# Patient Record
Sex: FEMALE | Race: WHITE | Employment: OTHER | URBAN - METROPOLITAN AREA
[De-identification: names, ages, dates, MRNs, and addresses within clinical notes are randomized per-mention and may not be internally consistent; named-entity substitution may affect disease eponyms.]

---

## 2018-10-18 ENCOUNTER — OFFICE VISIT (OUTPATIENT)
Dept: BEHAVIORAL/MENTAL HEALTH CLINIC | Age: 83
End: 2018-10-18

## 2018-10-18 VITALS
DIASTOLIC BLOOD PRESSURE: 63 MMHG | HEART RATE: 85 BPM | SYSTOLIC BLOOD PRESSURE: 160 MMHG | BODY MASS INDEX: 22.82 KG/M2 | WEIGHT: 124 LBS | HEIGHT: 62 IN

## 2018-10-18 DIAGNOSIS — F33.1 MODERATE EPISODE OF RECURRENT MAJOR DEPRESSIVE DISORDER (HCC): Primary | ICD-10-CM

## 2018-10-18 DIAGNOSIS — F10.21 ALCOHOL USE DISORDER, SEVERE, IN EARLY REMISSION (HCC): ICD-10-CM

## 2018-10-18 DIAGNOSIS — F41.8 OTHER SPECIFIED ANXIETY DISORDERS: ICD-10-CM

## 2018-10-18 DIAGNOSIS — G31.84 MCI (MILD COGNITIVE IMPAIRMENT) WITH MEMORY LOSS: ICD-10-CM

## 2018-10-18 RX ORDER — EZETIMIBE 10 MG/1
10 TABLET ORAL DAILY
COMMUNITY

## 2018-10-18 RX ORDER — MULTIVITAMIN
1 CAPSULE ORAL
COMMUNITY

## 2018-10-18 RX ORDER — BUSPIRONE HYDROCHLORIDE 15 MG/1
7.5 TABLET ORAL 2 TIMES DAILY
COMMUNITY
End: 2019-09-18 | Stop reason: SDUPTHER

## 2018-10-18 RX ORDER — GUAIFENESIN 100 MG/5ML
81 LIQUID (ML) ORAL DAILY
COMMUNITY

## 2018-10-18 RX ORDER — PANTOPRAZOLE SODIUM 40 MG/1
40 TABLET, DELAYED RELEASE ORAL
COMMUNITY

## 2018-10-18 RX ORDER — CETIRIZINE HCL 10 MG
10 TABLET ORAL
COMMUNITY

## 2018-10-18 RX ORDER — ATORVASTATIN CALCIUM 20 MG/1
20 TABLET, FILM COATED ORAL
COMMUNITY

## 2018-10-18 RX ORDER — IBUPROFEN 200 MG
950 CAPSULE ORAL
COMMUNITY
End: 2018-12-11 | Stop reason: ALTCHOICE

## 2018-10-18 RX ORDER — MIRTAZAPINE 15 MG/1
7.5 TABLET, FILM COATED ORAL
COMMUNITY
End: 2019-09-18 | Stop reason: SDUPTHER

## 2018-10-18 RX ORDER — LANOLIN ALCOHOL/MO/W.PET/CERES
500 CREAM (GRAM) TOPICAL DAILY
COMMUNITY

## 2018-10-18 RX ORDER — FERROUS SULFATE, DRIED 160(50) MG
1 TABLET, EXTENDED RELEASE ORAL
COMMUNITY

## 2018-10-18 RX ORDER — BUDESONIDE AND FORMOTEROL FUMARATE DIHYDRATE 80; 4.5 UG/1; UG/1
2 AEROSOL RESPIRATORY (INHALATION)
COMMUNITY

## 2018-10-18 RX ORDER — FLUTICASONE PROPIONATE 50 MCG
2 SPRAY, SUSPENSION (ML) NASAL DAILY
COMMUNITY

## 2018-10-18 RX ORDER — ALBUTEROL SULFATE 90 UG/1
AEROSOL, METERED RESPIRATORY (INHALATION)
COMMUNITY

## 2018-10-18 RX ORDER — CARVEDILOL 3.12 MG/1
3.12 TABLET ORAL 2 TIMES DAILY
COMMUNITY

## 2018-10-18 NOTE — PROGRESS NOTES
Psychiatric Outpatient Progress Note    Account Number:  [de-identified]  Name: Charline Lange    SUBJECTIVE:   CHIEF COMPLAINT:  Charline Lange is a 80 y.o. female and was seen today for first follow-up of psychiatric condition and psychotropic medication management. Pt was brought by her niece/POA from [place] [id number]. Pt was initially evaluated by me in [facility] on 8/18/18. Niece was not informed about this evaluation and she made this appointment independently for medications management. HPI:    Gerard Barthel reports the following psychiatric symptoms:  depression, anxiety and MCI, alcohol use d/o - in partial remission. The symptoms have been present for years and are of moderate severity. The symptoms occur daily. When patient was evaluated on 8/18/2018 in the assisted living facility, patient was adjusting to the environment fairly well. At that time also patient complained of not being happy with the relocation from [place] to [place], that she had all her friends and Jew activities. She reported that she misses all those activities and her friends. Today she continued to complain the same way and was very irritable and argumentative about her staying at his assisted living facility. Patient is marginally aware about the reasons why her POA decided to relocate her to [place]. Patient was in psychiatric facility in May of this year in [place], where she was treated for alcohol withdrawal syndrome, congestive heart failure and acute renal failure which was precipitated by her heavy drinking and not taking care of herself. After the discharge from hospital, patient was not doing so well and her niece decided to move her to [place] in an assisted living facility. Her niece is not very happy with the care provided in her current  facility and is planning to move her to another assisted living facility, which the patient does not like.   Patient reports sense of helplessness and hopelessness about her situation. She is not grossly manic or psychotic. She has mild cognitive impairment. She is sleeping and eating well. She does not associate with the residents in the facility and has not made any friends. She does participate in exercise routine and some meals in the dining area. Many times she prefers to eat by herself in her room. She has a cat and spends a lot of time with her. Patient is very nostalgic and wishes to go back to [place] where she can get back with her friends and [employer] goers. Patient has multiple medical problems and is on polypharmacy. At this time she is medically stable. She was 124 pounds and her BMI is 23. Today her blood pressure and heart rate is within normal limits. Patient is compliant with her medications. Contributing factors include: relocation    Patient denies SI/HI/SIB. Side Effects:  none      Fam/Soc Hx (from [place] with updates):  since 2010. No children. Her niece is her POA. Had [employer] diploma. Worked for [employer] in [employer] for years as . Relocated to [place] this July and lives in [address] . REVIEW OF SYSTEMS:  Constitutional: positive for fatigue  Eyes: positive for contacts/glasses  Ears, nose, mouth, throat, and face: negative for hearing loss and tinnitus  Respiratory: negative for cough or wheezing  Cardiovascular: negative for chest pain, palpitations  Gastrointestinal: negative for reflux symptoms and constipation  Genitourinary:negative for frequency and urinary incontinence  Musculoskeletal:negative for arthralgias  Neurological: positive for memory problems  Behavioral/Psych: positive for anxiety, bad mood and depression, negative for SI or HI    Visit Vitals  /63   Pulse 85   Ht 5' 2\" (1.575 m)   Wt 56.2 kg (124 lb)   BMI 22.68 kg/m²     SCALES: (10/18/18): MOCA: 21/30 - MCI  GDS:5/15 0 mild depression  HAM-A: 7 - WNL     MMSE: 27/30 - 7/14/18 at [facility]  GDS: 2/15 - 8/18/18 at [facility]    CT head : 5/29/18:    Old ischemic changes    OBJECTIVE:                 Mental Status exam: WNL except for      Sensorium  oriented to time, place and person   Relations cooperative and passive    Eye Contact    appropriate   Appearance:  age appropriate and casually dressed   Motor Behavior/Gait:  within normal limits   Speech:  normal pitch and normal volume   Thought Process: goal directed and logical   Thought Content free of delusions and free of hallucinations   Suicidal ideations none   Homicidal ideations none   Mood:  irritable   Affect:  anxious and labile   Memory recent  impaired   Memory remote:  adequate   Concentration:  impaired   Abstraction:  concrete   Insight:  limited   Reliability poor   Judgment:  limited       MEDICAL DECISION MAKING  Data: pertinent labs, imaging, medical records and diagnostic tests reviewed and incorporated in diagnosis and treatment plan    Allergies   Allergen Reactions    Influenza Virus Vaccines Other (comments)        Current Outpatient Medications   Medication Sig Dispense Refill    aspirin 81 mg chewable tablet Take 81 mg by mouth daily.  atorvastatin (LIPITOR) 20 mg tablet Take 20 mg by mouth nightly.  busPIRone (BUSPAR) 15 mg tablet Take 7.5 mg by mouth two (2) times a day.  calcium citrate 200 mg (950 mg) tablet Take 950 mg by mouth.  carvedilol (COREG) 3.125 mg tablet Take 3.125 mg by mouth two (2) times a day.  cetirizine (ZYRTEC) 10 mg tablet Take 10 mg by mouth.  ezetimibe (ZETIA) 10 mg tablet Take 10 mg by mouth daily.  fluticasone (FLONASE) 50 mcg/actuation nasal spray 2 Sprays by Both Nostrils route daily.  mirtazapine (REMERON) 15 mg tablet Take 7.5 mg by mouth nightly.  pantoprazole (PROTONIX) 40 mg tablet Take 40 mg by mouth.  denosumab (PROLIA) 60 mg/mL injection 60 mg by SubCUTAneous route.  budesonide-formoterol (SYMBICORT) 80-4.5 mcg/actuation HFAA Take 2 Puffs by inhalation.       multivitamin capsule Take 1 Cap by mouth.      calcium-vitamin D (OYSTER SHELL) 500 mg(1,250mg) -200 unit per tablet Take 1 Tab by mouth.  cyanocobalamin (VITAMIN B12) 500 mcg tablet Take 500 mcg by mouth daily.  albuterol (VENTOLIN HFA) 90 mcg/actuation inhaler Take  by inhalation.  tramadol HCl/acetaminophen (TRAMADOL-ACETAMINOPHEN PO) Take  by mouth. Problems addressed today:    ICD-10-CM ICD-9-CM    1. Moderate episode of recurrent major depressive disorder (HCC) F33.1 296.32    2. Other specified anxiety disorders F41.8 300.09    3. MCI (mild cognitive impairment) with memory loss G31.84 331.83      780.93    4. Alcohol use disorder, severe, in early remission [facility]) F10.21 305.03        Assessment:   Citlalli Roach  is a 80 y.o.  female  is responding to treatment. Symptoms are stable. Patient denies SI/HI/SIB. No evidence of AH/VH or delusions. Risk Scoring- chronic illnesses and prescription drug management    Treatment Plan:  1. Medications:          Medication Changes/Adjustments: Continue combination of BuSpar and mirtazapine in the current dosages. Patient and niece were told about my clinical impression of mild cognitive impairment and will need to start any medication for dementia at this time. Current Outpatient Medications   Medication Sig Dispense Refill    aspirin 81 mg chewable tablet Take 81 mg by mouth daily.  atorvastatin (LIPITOR) 20 mg tablet Take 20 mg by mouth nightly.  busPIRone (BUSPAR) 15 mg tablet Take 7.5 mg by mouth two (2) times a day.  calcium citrate 200 mg (950 mg) tablet Take 950 mg by mouth.  carvedilol (COREG) 3.125 mg tablet Take 3.125 mg by mouth two (2) times a day.  cetirizine (ZYRTEC) 10 mg tablet Take 10 mg by mouth.  ezetimibe (ZETIA) 10 mg tablet Take 10 mg by mouth daily.  fluticasone (FLONASE) 50 mcg/actuation nasal spray 2 Sprays by Both Nostrils route daily.       mirtazapine (REMERON) 15 mg tablet Take 7.5 mg by mouth nightly.  pantoprazole (PROTONIX) 40 mg tablet Take 40 mg by mouth.  denosumab (PROLIA) 60 mg/mL injection 60 mg by SubCUTAneous route.  budesonide-formoterol (SYMBICORT) 80-4.5 mcg/actuation HFAA Take 2 Puffs by inhalation.  multivitamin capsule Take 1 Cap by mouth.  calcium-vitamin D (OYSTER SHELL) 500 mg(1,250mg) -200 unit per tablet Take 1 Tab by mouth.  cyanocobalamin (VITAMIN B12) 500 mcg tablet Take 500 mcg by mouth daily.  albuterol (VENTOLIN HFA) 90 mcg/actuation inhaler Take  by inhalation.  tramadol HCl/acetaminophen (TRAMADOL-ACETAMINOPHEN PO) Take  by mouth. The following regarding medications was addressed:    (The risks and benefits of the proposed medication; the potential medication side effects ie    dry mouth, weight gain, GI upset, headache; patient given opportunity to ask questions)       2. Counseling and coordination of care including instructions for treatment, risks/benefits, risk factor reduction and patient/family education. She/niece agrees with the plan. Patient/niece instructed to call with any side effects, questions or issues. 3.  Patient was provided supportive counseling for her significant stress of relocation and multiple medical problems with polypharmacy. Niece was provided supportive counseling for her caregiver stress. We discussed pros and cons of her transfer to another assisted living facility, which may cause patient to become more unhappy. 4. Notes from her previous admissions, PCP and [facility] reviewed. PSYCHOTHERAPY:  approx 20 minutes  Type:  Supportive/Solution Focused psychotherapy provided  Focus:     Current problems:   Housing issues   Medical issues   Interpersonal conflicts    Psychoeducation provided:  Psych medications    Treatment plan reviewed with patient/niece-including diagnosis and medications    Roseanna Morales is slowly progressing.     Follow up : 2 months      Sukh Alvarez MD  10/18/2018

## 2018-12-11 ENCOUNTER — OFFICE VISIT (OUTPATIENT)
Dept: BEHAVIORAL/MENTAL HEALTH CLINIC | Age: 83
End: 2018-12-11

## 2018-12-11 VITALS
WEIGHT: 127 LBS | BODY MASS INDEX: 23.37 KG/M2 | SYSTOLIC BLOOD PRESSURE: 144 MMHG | HEART RATE: 88 BPM | DIASTOLIC BLOOD PRESSURE: 68 MMHG | HEIGHT: 62 IN

## 2018-12-11 DIAGNOSIS — F10.21 ALCOHOL USE DISORDER, SEVERE, IN EARLY REMISSION (HCC): ICD-10-CM

## 2018-12-11 DIAGNOSIS — G31.84 MCI (MILD COGNITIVE IMPAIRMENT) WITH MEMORY LOSS: ICD-10-CM

## 2018-12-11 DIAGNOSIS — F41.8 OTHER SPECIFIED ANXIETY DISORDERS: ICD-10-CM

## 2018-12-11 DIAGNOSIS — F33.1 MODERATE EPISODE OF RECURRENT MAJOR DEPRESSIVE DISORDER (HCC): Primary | ICD-10-CM

## 2018-12-11 RX ORDER — CHOLECALCIFEROL (VITAMIN D3) 125 MCG
CAPSULE ORAL
COMMUNITY

## 2018-12-11 RX ORDER — LOSARTAN POTASSIUM 25 MG/1
25 TABLET ORAL DAILY
COMMUNITY

## 2018-12-12 NOTE — PROGRESS NOTES
Psychiatric Outpatient Progress Note    Account Number:  [de-identified]  Name: Livier Licea    SUBJECTIVE:   CHIEF COMPLAINT:  Livier Licea is a 80 y.o. female and was seen today for 2 month FU of psychiatric condition and psychotropic medication management. Pt was brought by her niece/POA from [facility].      HPI:    Floyd Person reports the following psychiatric symptoms:  depression, anxiety and MCI, alcohol use d/o - in partial remission. The symptoms have been present for years and are of moderate severity. The symptoms occur daily.     Pt was seen in the presence of her niece. She reported that she is doing better. She thinks that she has adjusted well to her new [facility]. She reads, watches TV and participates in the unit activities and goes on the trips arranged by [facility]. She continues to be nostalgic abut her life in [place]. She is not grossly manic or psychotic. She has mild cognitive impairment. She is sleeping and eating well. She has a cat and spends a lot of time with her.      Patient has multiple medical problems and is on polypharmacy. At this time she is medically stable. Her weight is up by 3 lbs. her BMI is 23. Today her blood pressure and heart rate is within normal limits. Patient is compliant with her medications.     Contributing factors include: living in an [facility]     Patient denies SI/HI/SIB.      Side Effects:  none       Fam/Soc Hx (from [place] with updates):  since 2010. No children. Her niece is her POA. Had [employer] diploma. Worked for [employer] in [employer] for years as .  Relocated to [place] this July and lives in [facility] .       REVIEW OF SYSTEMS:  Constitutional: positive for fatigue  Eyes: positive for contacts/glasses  Ears, nose, mouth, throat, and face: negative for hearing loss and tinnitus  Respiratory: negative for cough or wheezing  Cardiovascular: negative for chest pain, palpitations  Gastrointestinal: negative for reflux symptoms and constipation  Genitourinary:negative for frequency and urinary incontinence  Musculoskeletal:negative for arthralgias  Neurological: positive for memory problems  Behavioral/Psych: positive for anxiety, bad mood and depression, negative for SI or HI     SCALES: (10/18/18): MOCA: 21/30 - MCI  GDS:5/15 0 mild depression  HAM-A: 7 - WNL      MMSE: 27/30 - 7/14/18 at [facility]  GDS: 2/15 - 8/18/18 at [facility]     CT head : 5/29/18:   Old ischemic changes      Visit Vitals  /68   Pulse 88   Ht 5' 2\" (1.575 m)   Wt 57.6 kg (127 lb)   BMI 23.23 kg/m²       OBJECTIVE:                 Mental Status exam: WNL except for      Sensorium  oriented to time, place and person   Relations cooperative    Eye Contact    appropriate   Appearance:  age appropriate and casually dressed   Motor Behavior/Gait:  within normal limits   Speech:  normal pitch and normal volume   Thought Process: goal directed and logical   Thought Content free of delusions and free of hallucinations   Suicidal ideations none   Homicidal ideations none   Mood:  euthymic   Affect:  full range   Memory recent  adequate   Memory remote:  adequate   Concentration:  adequate   Abstraction:  concrete   Insight:  fair   Reliability fair   Judgment:  fair       MEDICAL DECISION MAKING  Data: pertinent labs, imaging, medical records and diagnostic tests reviewed and incorporated in diagnosis and treatment plan    Allergies   Allergen Reactions    Influenza Virus Vaccines Other (comments)        Current Outpatient Medications   Medication Sig Dispense Refill    losartan (COZAAR) 25 mg tablet Take 25 mg by mouth daily.  cholecalciferol, vitamin D3, (VITAMIN D3) 2,000 unit tab Take  by mouth.  aspirin 81 mg chewable tablet Take 81 mg by mouth daily.  atorvastatin (LIPITOR) 20 mg tablet Take 20 mg by mouth nightly.  busPIRone (BUSPAR) 15 mg tablet Take 7.5 mg by mouth two (2) times a day.       carvedilol (COREG) 3.125 mg tablet Take 3.125 mg by mouth two (2) times a day.  cetirizine (ZYRTEC) 10 mg tablet Take 10 mg by mouth.  ezetimibe (ZETIA) 10 mg tablet Take 10 mg by mouth daily.  fluticasone (FLONASE) 50 mcg/actuation nasal spray 2 Sprays by Both Nostrils route daily.  mirtazapine (REMERON) 15 mg tablet Take 7.5 mg by mouth nightly.  pantoprazole (PROTONIX) 40 mg tablet Take 40 mg by mouth.  denosumab (PROLIA) 60 mg/mL injection 60 mg by SubCUTAneous route.  budesonide-formoterol (SYMBICORT) 80-4.5 mcg/actuation HFAA Take 2 Puffs by inhalation.  multivitamin capsule Take 1 Cap by mouth.  calcium-vitamin D (OYSTER SHELL) 500 mg(1,250mg) -200 unit per tablet Take 1 Tab by mouth.  cyanocobalamin (VITAMIN B12) 500 mcg tablet Take 500 mcg by mouth daily.  albuterol (VENTOLIN HFA) 90 mcg/actuation inhaler Take  by inhalation.  tramadol HCl/acetaminophen (TRAMADOL-ACETAMINOPHEN PO) Take  by mouth. Problems addressed today:    ICD-10-CM ICD-9-CM    1. Moderate episode of recurrent major depressive disorder (HCC) F33.1 296.32    2. Other specified anxiety disorders F41.8 300.09    3. MCI (mild cognitive impairment) with memory loss G31.84 331.83      780.93    4. Alcohol use disorder, severe, in early remission [facility]) F10.21 305.03        Assessment:   Pauly Gonzalez  is a 80 y.o.  female  is responding to treatment. Symptoms are stable. Patient denies SI/HI/SIB. No evidence of AH/VH or delusions. Risk Scoring- chronic illnesses and prescription drug management    Treatment Plan:  1. Medications:          Medication Changes/Adjustments: Continue combination of Buspar and Remeron in the current dosages. Current Outpatient Medications   Medication Sig Dispense Refill    losartan (COZAAR) 25 mg tablet Take 25 mg by mouth daily.  cholecalciferol, vitamin D3, (VITAMIN D3) 2,000 unit tab Take  by mouth.  aspirin 81 mg chewable tablet Take 81 mg by mouth daily.       atorvastatin (LIPITOR) 20 mg tablet Take 20 mg by mouth nightly.  busPIRone (BUSPAR) 15 mg tablet Take 7.5 mg by mouth two (2) times a day.  carvedilol (COREG) 3.125 mg tablet Take 3.125 mg by mouth two (2) times a day.  cetirizine (ZYRTEC) 10 mg tablet Take 10 mg by mouth.  ezetimibe (ZETIA) 10 mg tablet Take 10 mg by mouth daily.  fluticasone (FLONASE) 50 mcg/actuation nasal spray 2 Sprays by Both Nostrils route daily.  mirtazapine (REMERON) 15 mg tablet Take 7.5 mg by mouth nightly.  pantoprazole (PROTONIX) 40 mg tablet Take 40 mg by mouth.  denosumab (PROLIA) 60 mg/mL injection 60 mg by SubCUTAneous route.  budesonide-formoterol (SYMBICORT) 80-4.5 mcg/actuation HFAA Take 2 Puffs by inhalation.  multivitamin capsule Take 1 Cap by mouth.  calcium-vitamin D (OYSTER SHELL) 500 mg(1,250mg) -200 unit per tablet Take 1 Tab by mouth.  cyanocobalamin (VITAMIN B12) 500 mcg tablet Take 500 mcg by mouth daily.  albuterol (VENTOLIN HFA) 90 mcg/actuation inhaler Take  by inhalation.  tramadol HCl/acetaminophen (TRAMADOL-ACETAMINOPHEN PO) Take  by mouth. The following regarding medications was addressed:    (The risks and benefits of the proposed medication; the potential medication side effects ie    dry mouth, weight gain, GI upset, headache; patient given opportunity to ask questions)       2. Counseling and coordination of care including instructions for treatment, risks/benefits, risk factor reduction and patient/family education. She/niece agrees with the plan. Patient/niece instructed to call with any side effects, questions or issues. 3.  Pt and niece were educated on the pros and cons to changing [facility] at this stage when patient is settling down pretty well. Niece was provided supportive counseling for her caregiver stress. 4.  Pt and niece were educated on the pros and cons of starting medications for memory stabilization at this time. We will repeat cognitive scales in the next visit and then decide. PSYCHOTHERAPY:  approx 20 minutes  Type:  Supportive/Solution Focused psychotherapy provided  Focus:     Current problems:   Housing issues   Medical issues    Psychoeducation provided: psych medications    Treatment plan reviewed with patient/niece-including diagnosis and medications    Luwana Hazard is progressing. Follow-up Disposition:  Return in about 4 months (around 4/11/2019).       Tejinder Wyatt MD  12/12/2018

## 2019-04-18 ENCOUNTER — OFFICE VISIT (OUTPATIENT)
Dept: BEHAVIORAL/MENTAL HEALTH CLINIC | Age: 84
End: 2019-04-18

## 2019-04-18 VITALS
WEIGHT: 139 LBS | SYSTOLIC BLOOD PRESSURE: 162 MMHG | HEIGHT: 62 IN | HEART RATE: 97 BPM | BODY MASS INDEX: 25.58 KG/M2 | DIASTOLIC BLOOD PRESSURE: 63 MMHG

## 2019-04-18 DIAGNOSIS — F41.8 OTHER SPECIFIED ANXIETY DISORDERS: ICD-10-CM

## 2019-04-18 DIAGNOSIS — F33.1 MODERATE EPISODE OF RECURRENT MAJOR DEPRESSIVE DISORDER (HCC): Primary | ICD-10-CM

## 2019-04-18 DIAGNOSIS — G31.84 MCI (MILD COGNITIVE IMPAIRMENT) WITH MEMORY LOSS: ICD-10-CM

## 2019-04-18 DIAGNOSIS — F10.21 ALCOHOL USE DISORDER, SEVERE, IN EARLY REMISSION (HCC): ICD-10-CM

## 2019-04-18 PROBLEM — K21.9 GERD (GASTROESOPHAGEAL REFLUX DISEASE): Status: ACTIVE | Noted: 2019-04-18

## 2019-04-18 PROBLEM — M81.0 OSTEOPOROSIS: Status: ACTIVE | Noted: 2019-04-18

## 2019-04-18 PROBLEM — M19.90 DJD (DEGENERATIVE JOINT DISEASE): Status: ACTIVE | Noted: 2019-04-18

## 2019-04-18 PROBLEM — I42.9 CARDIOMYOPATHY (HCC): Status: ACTIVE | Noted: 2019-04-18

## 2019-04-18 PROBLEM — I10 HYPERTENSION: Status: ACTIVE | Noted: 2019-04-18

## 2019-04-18 PROBLEM — J44.9 CHRONIC OBSTRUCTIVE PULMONARY DISEASE (HCC): Status: ACTIVE | Noted: 2019-04-18

## 2019-04-18 PROBLEM — E78.00 HYPERCHOLESTEROLEMIA: Status: ACTIVE | Noted: 2019-04-18

## 2019-04-18 PROBLEM — E55.9 VITAMIN D DEFICIENCY: Status: ACTIVE | Noted: 2019-04-18

## 2019-04-18 NOTE — PROGRESS NOTES
Psychiatric Outpatient Progress Note    Account Number:  [de-identified]  Name: Herminia Martin    SUBJECTIVE:   CHIEF COMPLAINT:  Jt Barfield a 80 y. o. female and was seen today for 4 month FU of psychiatric condition and psychotropic medication management. Pt was brought by her niece/POA from [facility].      HPI:    Briana reports the following psychiatric symptoms:  depression, anxiety and MCI, alcohol use d/o - in partial remission.  The symptoms have been present for years and are of moderate severity. The symptoms occur daily.     Pt was seen in the presence of her niece. She reported that she is not doing worth a darn. She was irritable, angry and tearful. She went right into her nostalgia about her life [place], [place] and how much she misses her friends, [employer] and her house. She blames me for arguing with her. She reads, watches TV and participates in the unit activities and goes on the trips arranged by [facility]. She is not grossly manic or psychotic. Khoi Molina has mild cognitive impairment.  She is sleeping and eating well. She has a cat and spends a lot of time with her. Today, repeat MOCA is 20/30 - poor effort. Mostly unchanged.     Patient has multiple medical problems and is on polypharmacy.  At this time she is medically stable. Her weight is up by 12 lbs. her BMI is 25  Today her blood pressure and heart rate is within normal limits.  Patient is compliant with her medications.     Contributing factors include: living in an [facility]     Patient denies SI/HI/SIB.      Side Effects:  none       Fam/Soc Hx (from [facility]ial Eval with updates):  since 2010. No children. Her niece is her POA. Had [employer] diploma. Worked for [email or web address] in [place] for years as . Relocated to [place] this July and lives in [address] .        REVIEW OF SYSTEMS:  Constitutional: positive for fatigue  Eyes: positive for contacts/glasses  Ears, nose, mouth, throat, and face: negative for hearing loss and tinnitus  Respiratory: negative for cough or wheezing  Cardiovascular: negative for chest pain, palpitations  Gastrointestinal: negative for reflux symptoms and constipation  Genitourinary:negative for frequency and urinary incontinence  Musculoskeletal:negative for arthralgias  Neurological: positive for memory problems  Behavioral/Psych: positive for anxiety, bad mood and depression, negative for SI or HI     SCALES: (10/18/18): MOCA: 21/30 - MCI vs: 20/30 ( 4/18/19)  GDS:5/15 0 mild depression  HAM-A: 7 - WNL      MMSE: 27/30 - 7/14/18 at [facility]  GDS: 2/15 - 8/18/18 at [facility]     CT head : 5/29/18:   Old ischemic changes      Visit Vitals  /63   Pulse 97   Ht 5' 2\" (1.575 m)   Wt 63 kg (139 lb)   BMI 25.42 kg/m²       OBJECTIVE:                 Mental Status exam: WNL except for      Sensorium  oriented to time, place and person   Relations cooperative and passive    Eye Contact    appropriate   Appearance:  age appropriate and casually dressed   Motor Behavior/Gait:  within normal limits   Speech:  normal pitch and normal volume   Thought Process: goal directed and logical   Thought Content free of delusions and free of hallucinations   Suicidal ideations none   Homicidal ideations none   Mood:  angry and irritable   Affect:  anxious and labile   Memory recent  impaired   Memory remote:  adequate   Concentration:  impaired   Abstraction:  concrete   Insight:  limited   Reliability poor   Judgment:  limited       MEDICAL DECISION MAKING  Data: pertinent labs, imaging, medical records and diagnostic tests reviewed and incorporated in diagnosis and treatment plan    Allergies   Allergen Reactions    Influenza Virus Vaccines Other (comments)        Current Outpatient Medications   Medication Sig Dispense Refill    losartan (COZAAR) 25 mg tablet Take 25 mg by mouth daily.  cholecalciferol, vitamin D3, (VITAMIN D3) 2,000 unit tab Take  by mouth.  aspirin 81 mg chewable tablet Take 81 mg by mouth daily.  atorvastatin (LIPITOR) 20 mg tablet Take 20 mg by mouth nightly.  busPIRone (BUSPAR) 15 mg tablet Take 7.5 mg by mouth two (2) times a day.  carvedilol (COREG) 3.125 mg tablet Take 3.125 mg by mouth two (2) times a day.  cetirizine (ZYRTEC) 10 mg tablet Take 10 mg by mouth.  ezetimibe (ZETIA) 10 mg tablet Take 10 mg by mouth daily.  fluticasone (FLONASE) 50 mcg/actuation nasal spray 2 Sprays by Both Nostrils route daily.  mirtazapine (REMERON) 15 mg tablet Take 7.5 mg by mouth nightly.  pantoprazole (PROTONIX) 40 mg tablet Take 40 mg by mouth.  denosumab (PROLIA) 60 mg/mL injection 60 mg by SubCUTAneous route.  budesonide-formoterol (SYMBICORT) 80-4.5 mcg/actuation HFAA Take 2 Puffs by inhalation.  multivitamin capsule Take 1 Cap by mouth.  calcium-vitamin D (OYSTER SHELL) 500 mg(1,250mg) -200 unit per tablet Take 1 Tab by mouth.  cyanocobalamin (VITAMIN B12) 500 mcg tablet Take 500 mcg by mouth daily.  albuterol (VENTOLIN HFA) 90 mcg/actuation inhaler Take  by inhalation.  tramadol HCl/acetaminophen (TRAMADOL-ACETAMINOPHEN PO) Take  by mouth. Problems addressed today:    ICD-10-CM ICD-9-CM    1. Moderate episode of recurrent major depressive disorder (HCC) F33.1 296.32    2. MCI (mild cognitive impairment) with memory loss G31.84 331.83      780.93    3. Other specified anxiety disorders F41.8 300.09    4. Alcohol use disorder, severe, in early remission [facility]) F10.21 305.03        Assessment:   Olive Sim  is a 80 y.o.  female  is responding to treatment. Symptoms are stable. Patient denies SI/HI/SIB. No evidence of AH/VH or delusions. Risk Scoring- chronic illnesses and prescription drug management    Treatment Plan:  1. Medications:          Medication Changes/Adjustments: Continue Remeron and Buspar in the current dosages.      Current Outpatient Medications   Medication Sig Dispense Refill    losartan (COZAAR) 25 mg tablet Take 25 mg by mouth daily.  cholecalciferol, vitamin D3, (VITAMIN D3) 2,000 unit tab Take  by mouth.  aspirin 81 mg chewable tablet Take 81 mg by mouth daily.  atorvastatin (LIPITOR) 20 mg tablet Take 20 mg by mouth nightly.  busPIRone (BUSPAR) 15 mg tablet Take 7.5 mg by mouth two (2) times a day.  carvedilol (COREG) 3.125 mg tablet Take 3.125 mg by mouth two (2) times a day.  cetirizine (ZYRTEC) 10 mg tablet Take 10 mg by mouth.  ezetimibe (ZETIA) 10 mg tablet Take 10 mg by mouth daily.  fluticasone (FLONASE) 50 mcg/actuation nasal spray 2 Sprays by Both Nostrils route daily.  mirtazapine (REMERON) 15 mg tablet Take 7.5 mg by mouth nightly.  pantoprazole (PROTONIX) 40 mg tablet Take 40 mg by mouth.  denosumab (PROLIA) 60 mg/mL injection 60 mg by SubCUTAneous route.  budesonide-formoterol (SYMBICORT) 80-4.5 mcg/actuation HFAA Take 2 Puffs by inhalation.  multivitamin capsule Take 1 Cap by mouth.  calcium-vitamin D (OYSTER SHELL) 500 mg(1,250mg) -200 unit per tablet Take 1 Tab by mouth.  cyanocobalamin (VITAMIN B12) 500 mcg tablet Take 500 mcg by mouth daily.  albuterol (VENTOLIN HFA) 90 mcg/actuation inhaler Take  by inhalation.  tramadol HCl/acetaminophen (TRAMADOL-ACETAMINOPHEN PO) Take  by mouth. The following regarding medications was addressed:    (The risks and benefits of the proposed medication; the potential medication side effects ie    dry mouth, weight gain, GI upset, headache; patient given opportunity to ask questions)       2. Counseling and coordination of care including instructions for treatment, risks/benefits, risk factor reduction and patient/family education. She agrees with the plan. Patient instructed to call with any side effects, questions or issues. 3.  Recommend dietary consult in the [place].     4.  Pt was provided supportive counseling for her stress of living in an [place].     PSYCHOTHERAPY:  approx 20 minutes  Type:  Supportive/Solution Focused psychotherapy provided  Focus:     Current problems:   Housing issues - [place]   Medical issues   Interpersonal conflicts - with niece/POA    Psychoeducation provided: psych medications. Treatment plan reviewed with patient-including diagnosis and medications    Jovannarock JorgeMccoy is not progressing. Follow-up and Dispositions    · Return in about 4 months (around 8/18/2019).            Lay Naranjo MD  4/18/2019

## 2019-05-16 ENCOUNTER — HOSPITAL ENCOUNTER (OUTPATIENT)
Dept: NUTRITION | Age: 84
Discharge: HOME | End: 2019-05-16
Payer: MEDICARE

## 2019-05-16 PROCEDURE — 97802 MEDICAL NUTRITION INDIV IN: CPT | Performed by: DIETITIAN, REGISTERED

## 2019-05-16 NOTE — PROGRESS NOTES
[address] [place], [address] [facility] [place]  Phone: [phone] Fax: [phone]   Nutrition Assessment - Medical Nutrition Therapy   Outpatient Initial Evaluation         Patient Name: Phoenix Childress : 1934   Treatment Diagnosis: Weight gain   Referral Source: Nicole Goins MD [place] [facility]): 2019     Gender: female Age: 80 y.o. Ht: 62 in Wt: 134 lb  kg   BMI: 25.1 RMR   Male  RMR Female 1025   Anthropometrics Assessment: Per BMI, pt is considered overweight. Moderate abdominal adiposity is evident based on visual observation. Reported unintended weight gain of 3# in 2 weeks. Past Medical History includes: MCI with memory loss, HTN, GERD, DJD, High Cholesterol, COPD, Cardiomyopathy, Osteoporosis, Depression, Anxiety     Pertinent Medications:     Current Outpatient Medications:     losartan (COZAAR) 25 mg tablet, Take 25 mg by mouth daily. , Disp: , Rfl:     cholecalciferol, vitamin D3, (VITAMIN D3) 2,000 unit tab, Take  by mouth., Disp: , Rfl:     aspirin 81 mg chewable tablet, Take 81 mg by mouth daily. , Disp: , Rfl:     atorvastatin (LIPITOR) 20 mg tablet, Take 20 mg by mouth nightly., Disp: , Rfl:     busPIRone (BUSPAR) 15 mg tablet, Take 7.5 mg by mouth two (2) times a day., Disp: , Rfl:     carvedilol (COREG) 3.125 mg tablet, Take 3.125 mg by mouth two (2) times a day., Disp: , Rfl:     cetirizine (ZYRTEC) 10 mg tablet, Take 10 mg by mouth., Disp: , Rfl:     ezetimibe (ZETIA) 10 mg tablet, Take 10 mg by mouth daily. , Disp: , Rfl:     fluticasone (FLONASE) 50 mcg/actuation nasal spray, 2 Sprays by Both Nostrils route daily. , Disp: , Rfl:     mirtazapine (REMERON) 15 mg tablet, Take 7.5 mg by mouth nightly., Disp: , Rfl:     pantoprazole (PROTONIX) 40 mg tablet, Take 40 mg by mouth., Disp: , Rfl:     denosumab (PROLIA) 60 mg/mL injection, 60 mg by SubCUTAneous route., Disp: , Rfl:     budesonide-formoterol (SYMBICORT) 80-4.5 mcg/actuation HFAA, Take 2 Puffs by inhalation. , Disp: , Rfl:     multivitamin capsule, Take 1 Cap by mouth., Disp: , Rfl:     calcium-vitamin D (OYSTER SHELL) 500 mg(1,250mg) -200 unit per tablet, Take 1 Tab by mouth., Disp: , Rfl:     cyanocobalamin (VITAMIN B12) 500 mcg tablet, Take 500 mcg by mouth daily. , Disp: , Rfl:     albuterol (VENTOLIN HFA) 90 mcg/actuation inhaler, Take  by inhalation. , Disp: , Rfl:     tramadol HCl/acetaminophen (TRAMADOL-ACETAMINOPHEN PO), Take  by mouth., Disp: , Rfl:      Biochemical Data:   No results found for: HBA1C, HGBE8, OCG8JVJE, JOG3UOMD  No results found for: CHOL, CHOLPOCT, CHOLX, CHLST, CHOLV, HDL, HDLPOC, LDL, LDLCPOC, LDLC, DLDLP, VLDLC, VLDL, TGLX, TRIGL, TRIGP, TGLPOCT, CHHD, CHHDX  No results found for: GPT, ALT, SGOT, GGT, GGTP, AP, APIT, APX, CBIL, TBIL, TBILI  No results found for: MACHELLE, CREAPOC, ACREA, CREA, REFC3, REFC4  No results found for: BUN, BUNPOC, IBUN, MBUNV, BUNV  No results found for: MCACR, MCA1, MCA2, MCA3, MCAU, MCAU2, MCALPOCT     Nutrition Diagnosis Excessive energy intake R/T using food for emotional comfort and consuming large portions at meals AEB weight gain of 3# in 2 weeks     Subjective/Assessment: Pt is an 81yo female here today with her niece. She lives at an assisted living facility since June and niece notes she has gained about 40# since moving there. Pt has been having more trouble breathing since gaining weight and would like to stop the weight gain. She currently walks 3 loops around outside 2 times per day. She feels this is enough exercise. She is now in PT to increase stamina. Pt expressed many emotions today, positive and negative. Current Eating Patterns: B- greek yogurt  L- served in [employer]. Eats all of what is served most days. Sometimes half of roll.  4oz gingerale with meals  Ex: meatloaf with mashed potatoes, gravey, broccoli  Does not eat the dessert typically  D- same as lunch  Sometimes orders salad with grilled chicken with blue cheese dressing. Must order this 30-60min ahead of time. S- eats when feeling upset: cheetos, chex mix, fruit, vegetables, candy, etc. (unable to identify amount or frequency)   eats out with niece 2 times per week. [employer]ley[employer] 2 rolls and low country shrimp (eats all)  Neisha Tuesdays salmon, broccoli, 10 hushpuppies     Estimate Needs   Calories: 1250 Protein: 63 Carbs: 156 Fat: 42   Kcal/day  g/day  g/day  g/day        percent: 20  50  30               Education & Recommendations provided: Worked with pt and angelica to identify ways of reducing small amounts of carlories from current food patterns. Discussed ways to handle stress and emotions without using food for comfort. Spoke with nursing facility on phone. They are able to make dietary changes to food she is served but only with MD order or their facility Dietitian.     Handouts Provided: []  Carbohydrates  []  Protein  []  Fiber  []  Serving Sizes  []  Meal and Snack Ideas  []  Food Journals []  Diabetes  []  Cholesterol  []  Sodium  []  Gen Nutr Guidelines  []  SBGM Guidelines  []  Others:   Information Reviewed with: Pt and drew   Readiness to Change Stage: []  Pre-contemplative    [x]  Contemplative  []  Preparation               []  Action                  []  Maintenance   Potential Barriers to Learning: []  Decline in memory    []  Language barrier   []  Other:  [x]  Emotional                  []  Limited mobility  [x]  Lack of motivation     [] Vision, hearing or cognitive impairment   Expected Compliance: Fair     Nutritional Goal - To promote lifestyle changes to result in:    [x]  Weight loss  []  Improved diabetic control  []  Decreased cholesterol levels  [x]  Decreased blood pressure  []  Weight maintenance []  Preventing any interactions associated with food allergies  []  Adequate weight gain toward goal weight  []  Other: Patient Goals:  SMART goals Decrease total calorie intake by. ..  -ordering salad with grilled chicken at dinner 3 nights per week  -not eating bread/roll with lunch or dinner  -pushing aside small amount after each meal  -only keeping fruit and vegetables for snacks in room  -pack up leftovers when eating out    -continue current exercise of 3 loops 2 times per day  -decrease calorie intake from stress eating by instead: talking to dog, daydreaming, walking, etc.   Total Treatment Time: 60min   Dietitian Signature: J Carlos Coker MS RD Date: 5/16/2019   Follow-up: PRN Time: 4:57 PM

## 2019-09-18 ENCOUNTER — OFFICE VISIT (OUTPATIENT)
Dept: BEHAVIORAL/MENTAL HEALTH CLINIC | Age: 84
End: 2019-09-18

## 2019-09-18 VITALS
DIASTOLIC BLOOD PRESSURE: 44 MMHG | BODY MASS INDEX: 25.03 KG/M2 | HEIGHT: 62 IN | HEART RATE: 94 BPM | WEIGHT: 136 LBS | SYSTOLIC BLOOD PRESSURE: 125 MMHG

## 2019-09-18 DIAGNOSIS — F41.8 OTHER SPECIFIED ANXIETY DISORDERS: ICD-10-CM

## 2019-09-18 DIAGNOSIS — F33.1 MODERATE EPISODE OF RECURRENT MAJOR DEPRESSIVE DISORDER (HCC): Primary | ICD-10-CM

## 2019-09-18 DIAGNOSIS — F10.21 ALCOHOL USE DISORDER, SEVERE, IN EARLY REMISSION (HCC): ICD-10-CM

## 2019-09-18 DIAGNOSIS — G31.84 MCI (MILD COGNITIVE IMPAIRMENT) WITH MEMORY LOSS: ICD-10-CM

## 2019-09-18 RX ORDER — DICLOFENAC SODIUM 10 MG/G
GEL TOPICAL 4 TIMES DAILY
COMMUNITY

## 2019-09-18 RX ORDER — HYDROCHLOROTHIAZIDE 25 MG/1
25 TABLET ORAL DAILY
COMMUNITY

## 2019-09-18 RX ORDER — MIRTAZAPINE 15 MG/1
7.5 TABLET, FILM COATED ORAL
Qty: 45 TAB | Refills: 1 | Status: SHIPPED | OUTPATIENT
Start: 2019-09-18

## 2019-09-18 RX ORDER — BUSPIRONE HYDROCHLORIDE 15 MG/1
7.5 TABLET ORAL 2 TIMES DAILY
Qty: 90 TAB | Refills: 1 | Status: SHIPPED | OUTPATIENT
Start: 2019-09-18

## 2019-09-18 NOTE — PROGRESS NOTES
Sherwin Angry  1934  80 y.o.  female  [facility]    Chief Complaint   Patient presents with    Depression     Doing well on mirtazapine    Anxiety     Doing well on mirtazapine and BuSpar    Memory Loss     Niece reported significant memory decline in past 5 months since last seen Dr. Jose Sultana so I advised them to get second opinion from a neurologist       [place]:   This is a 80years old  female with past psychiatric history and treatment of moderate recurrent depression, unspecified anxiety disorder, alcohol use disorder severe in early remission, and mild cognitive impairment with memory loss who was under care of Dr. Jose Sultana last seen on April 18, 2019 when she was prescribed mirtazapine 7.5 mg at bedtime and buspirone 7.5 mg twice a day. Shaye Varela presented on time along with her niece/POA from [address]. She was alert awake oriented to time person and place, was semicooperative and mildly irritable and mean and on MMSE exam shows some short-term memory loss. She report compliance with both of her medication, is able to tolerate, and reported satisfaction and requested to continue current treatment plan and return in 6 months for evaluation. Patient relocated from [place] to [place] and was not happy. Her niece is worried about her significant short-term memory loss in the past 5 months since last seen by Dr. Jose Sultana. She was in psychiatric facility in May of last year in [place], where she was treated for alcohol withdrawal syndrome, congestive heart failure and acute renal failure which was precipitated by her heavy drinking and not taking care of herself. After the discharge from hospital, patient was not doing so well and her niece decided to move her to [place] in an assisted living facility.     Fam/Soc Hx (from Britta with updates):  since 2010. No children. Her niece is her POA. Had [employer] diploma.  Worked for [employer] in [employer] for years as . Relocated to [place] this July and lives in [address] . SUBSTANCE USE HISTORY:   Patient denies. MEDICAL HISTORY:    has a past medical history of Cancer ([facility] [address] Cardiomyopathy ([facility] [address] Chronic obstructive pulmonary disease ([facility] [address] Depression, DJD (degenerative joint disease), GERD (gastroesophageal reflux disease), Hypercholesterolemia, Hypertension, Osteoporosis, Pulmonary hypertension ([facility] [address] RLS (restless legs syndrome), and Vitamin D deficiency. ALLERGIES:   Allergies   Allergen Reactions    Influenza Virus Vaccines Other (comments)       VITAL SIGNS:  /44 (BP 1 Location: Left arm, BP Patient Position: Sitting)   Pulse 94   Ht 5' 2\" (1.575 m)   Wt 61.7 kg (136 lb)   BMI 24.87 kg/m²     Current Outpatient Medications   Medication Sig Dispense Refill    diclofenac (VOLTAREN) 1 % gel Apply  to affected area four (4) times daily.  hydroCHLOROthiazide (HYDRODIURIL) 25 mg tablet Take 25 mg by mouth daily.  mirtazapine (REMERON) 15 mg tablet Take 0.5 Tabs by mouth nightly. Indications: major depressive disorder 45 Tab 1    busPIRone (BUSPAR) 15 mg tablet Take 0.5 Tabs by mouth two (2) times a day. Indications: Repeated Episodes of Anxiety 90 Tab 1    losartan (COZAAR) 25 mg tablet Take 25 mg by mouth daily.  cholecalciferol, vitamin D3, (VITAMIN D3) 2,000 unit tab Take  by mouth.  aspirin 81 mg chewable tablet Take 81 mg by mouth daily.  atorvastatin (LIPITOR) 20 mg tablet Take 20 mg by mouth nightly.  carvedilol (COREG) 3.125 mg tablet Take 3.125 mg by mouth two (2) times a day.  cetirizine (ZYRTEC) 10 mg tablet Take 10 mg by mouth.  ezetimibe (ZETIA) 10 mg tablet Take 10 mg by mouth daily.  fluticasone (FLONASE) 50 mcg/actuation nasal spray 2 Sprays by Both Nostrils route daily.  pantoprazole (PROTONIX) 40 mg tablet Take 40 mg by mouth.  denosumab (PROLIA) 60 mg/mL injection 60 mg by SubCUTAneous route.       budesonide-formoterol (SYMBICORT) 80-4.5 mcg/actuation HFAA Take 2 Puffs by inhalation.  multivitamin capsule Take 1 Cap by mouth.  calcium-vitamin D (OYSTER SHELL) 500 mg(1,250mg) -200 unit per tablet Take 1 Tab by mouth.  cyanocobalamin (VITAMIN B12) 500 mcg tablet Take 500 mcg by mouth daily.  albuterol (VENTOLIN HFA) 90 mcg/actuation inhaler Take  by inhalation.  tramadol HCl/acetaminophen (TRAMADOL-ACETAMINOPHEN PO) Take  by mouth. ROS:  Constitutional: Negative for fever  Eyes: Positive for glasses  ENT: Slight hearing loss  Respiratory: COPD  Cardiovascular: Hypertension and cardiomyopathy  Gastrointestinal: GERD  Musculoskeletal: Degenerative joint disease and osteoporosis  Neurological: Positive for memory problems  Behavioral/psych: Positive for insomnia, anxiety, depression, negative for SI/HI  Endocrine: Negative for diabetic symptoms including polyuria and polydipsia    MENTAL STATUS EXAMINATION:   Patient is a 80 y.o. female Formerly named [facility] who looks her stated age. Patient appearance is nicely dressed with good hygiene slightly hard to hear. Speech is regular rate and rhythm, fluent language, and thought process is linear, logical, and goal directed. Reported mood is not good with mood congruent mildly irritable affect. Patient denies any suicidal ideation, homicidal ideation, and auditory visual hallucination. Not observed to be delusional or paranoid. Insight, judgement, reliability and impulse control is limited. Her cognition is slightly compromised especially short-term memory and she was observed to be reliable. DIAGNOSIS:  Encounter Diagnoses   Name Primary?  Moderate episode of recurrent major depressive disorder (HCC) Yes    MCI (mild cognitive impairment) with memory loss     Other specified anxiety disorders     Alcohol use disorder, severe, in early remission ([facility] [address]        PLAN:  1. MEDICATION:   1.   Depression and anxiety: Mirtazapine 7.5 mg at bedtime. 2.  Generalized anxiety: BuSpar 7.5 mg twice a day. 3.  Mild cognitive impairment with memory loss: Referral to neurologist, niece report marked decrease in her short-term memory since last seen 5 months ago by Dr. Diana Michel in the context of her 3 sisters has Alzheimer's dementia. Patient and her niece were provided with psycho education, discussed risk/benefits, and expectations from medication changes. Patient agrees with plan. 2. PSYCHOTHERAPY: Patient was provided with supportive therapy, strongly encourage to seek psychotherapy. 3. MEDICAL CARE: Patient was strongly encourage to take their medical medications and follow up with their PCP on regular basis. Her weight today is 136 pound, blood pressure is 125/44 and pulse is 94. She has hypertension, cardiomyopathy, GERD, chronic obstructive pulmonary disease, degenerative joint disease and osteoporosis, hypercholesterolemia, vitamin D deficiency, and history of right lung cancer. 4. SUBSTANCE ABUSE CARE: Patient denies. 5. FOLLOW UP:   Follow-up and Dispositions    · Return in about 3 months (around 12/18/2019) for Medication management.

## 2021-05-26 ENCOUNTER — TELEPHONE (OUTPATIENT)
Dept: PULMONOLOGY | Age: 86
End: 2021-05-26

## 2022-03-18 PROBLEM — F41.8 OTHER SPECIFIED ANXIETY DISORDERS: Status: ACTIVE | Noted: 2018-10-18

## 2022-03-18 PROBLEM — E55.9 VITAMIN D DEFICIENCY: Status: ACTIVE | Noted: 2019-04-18

## 2022-03-18 PROBLEM — F10.21 ALCOHOL USE DISORDER, SEVERE, IN EARLY REMISSION (HCC): Status: ACTIVE | Noted: 2018-10-18

## 2022-03-19 PROBLEM — K21.9 GERD (GASTROESOPHAGEAL REFLUX DISEASE): Status: ACTIVE | Noted: 2019-04-18

## 2022-03-19 PROBLEM — I10 HYPERTENSION: Status: ACTIVE | Noted: 2019-04-18

## 2022-03-19 PROBLEM — G31.84 MCI (MILD COGNITIVE IMPAIRMENT) WITH MEMORY LOSS: Status: ACTIVE | Noted: 2018-10-18

## 2022-03-19 PROBLEM — I42.9 CARDIOMYOPATHY (HCC): Status: ACTIVE | Noted: 2019-04-18

## 2022-03-19 PROBLEM — M19.90 DJD (DEGENERATIVE JOINT DISEASE): Status: ACTIVE | Noted: 2019-04-18

## 2022-03-19 PROBLEM — F33.1 MODERATE EPISODE OF RECURRENT MAJOR DEPRESSIVE DISORDER (HCC): Status: ACTIVE | Noted: 2018-10-18

## 2022-03-20 PROBLEM — M81.0 OSTEOPOROSIS: Status: ACTIVE | Noted: 2019-04-18

## 2022-03-20 PROBLEM — E78.00 HYPERCHOLESTEROLEMIA: Status: ACTIVE | Noted: 2019-04-18

## 2022-03-20 PROBLEM — J44.9 CHRONIC OBSTRUCTIVE PULMONARY DISEASE (HCC): Status: ACTIVE | Noted: 2019-04-18

## 2023-05-17 RX ORDER — CARVEDILOL 3.12 MG/1
3.12 TABLET ORAL 2 TIMES DAILY
COMMUNITY

## 2023-05-17 RX ORDER — ATORVASTATIN CALCIUM 20 MG/1
20 TABLET, FILM COATED ORAL NIGHTLY
COMMUNITY

## 2023-05-17 RX ORDER — HYDROCHLOROTHIAZIDE 25 MG/1
25 TABLET ORAL DAILY
COMMUNITY

## 2023-05-17 RX ORDER — B-COMPLEX WITH VITAMIN C
1 TABLET ORAL
COMMUNITY

## 2023-05-17 RX ORDER — BUSPIRONE HYDROCHLORIDE 15 MG/1
7.5 TABLET ORAL 2 TIMES DAILY
COMMUNITY
Start: 2019-09-18

## 2023-05-17 RX ORDER — CETIRIZINE HYDROCHLORIDE 10 MG/1
10 TABLET ORAL
COMMUNITY

## 2023-05-17 RX ORDER — MIRTAZAPINE 15 MG/1
7.5 TABLET, FILM COATED ORAL
COMMUNITY
Start: 2019-09-18

## 2023-05-17 RX ORDER — LOSARTAN POTASSIUM 25 MG/1
25 TABLET ORAL DAILY
COMMUNITY

## 2023-05-17 RX ORDER — PANTOPRAZOLE SODIUM 40 MG/1
40 TABLET, DELAYED RELEASE ORAL
COMMUNITY

## 2023-05-17 RX ORDER — EZETIMIBE 10 MG/1
10 TABLET ORAL DAILY
COMMUNITY

## 2023-05-17 RX ORDER — FLUTICASONE PROPIONATE 50 MCG
2 SPRAY, SUSPENSION (ML) NASAL DAILY
COMMUNITY

## 2023-05-17 RX ORDER — ASPIRIN 81 MG/1
81 TABLET, CHEWABLE ORAL DAILY
COMMUNITY

## 2023-05-17 RX ORDER — BUDESONIDE AND FORMOTEROL FUMARATE DIHYDRATE 80; 4.5 UG/1; UG/1
2 AEROSOL RESPIRATORY (INHALATION)
COMMUNITY

## 2023-05-17 RX ORDER — ALBUTEROL SULFATE 90 UG/1
AEROSOL, METERED RESPIRATORY (INHALATION)
COMMUNITY